# Patient Record
Sex: FEMALE | URBAN - METROPOLITAN AREA
[De-identification: names, ages, dates, MRNs, and addresses within clinical notes are randomized per-mention and may not be internally consistent; named-entity substitution may affect disease eponyms.]

---

## 2024-02-01 ENCOUNTER — EMERGENCY (EMERGENCY)
Facility: HOSPITAL | Age: 34
LOS: 1 days | Discharge: HOME | End: 2024-02-01
Attending: EMERGENCY MEDICINE | Admitting: EMERGENCY MEDICINE
Payer: MEDICAID

## 2024-02-01 VITALS
HEART RATE: 78 BPM | SYSTOLIC BLOOD PRESSURE: 109 MMHG | TEMPERATURE: 98 F | OXYGEN SATURATION: 100 % | RESPIRATION RATE: 18 BRPM | DIASTOLIC BLOOD PRESSURE: 80 MMHG

## 2024-02-01 PROCEDURE — 99285 EMERGENCY DEPT VISIT HI MDM: CPT

## 2024-02-01 NOTE — ED ADULT TRIAGE NOTE - CHIEF COMPLAINT QUOTE
patient c/o left foot pain post slip and fall in [place]. Patient unsure what xrays states but believes fracture. Patient able to move left foot digits.

## 2024-02-02 VITALS
RESPIRATION RATE: 17 BRPM | SYSTOLIC BLOOD PRESSURE: 106 MMHG | TEMPERATURE: 98 F | DIASTOLIC BLOOD PRESSURE: 69 MMHG | HEART RATE: 74 BPM | OXYGEN SATURATION: 100 %

## 2024-02-02 PROBLEM — Z00.00 ENCOUNTER FOR PREVENTIVE HEALTH EXAMINATION: Status: ACTIVE | Noted: 2024-02-02

## 2024-02-02 PROCEDURE — 73610 X-RAY EXAM OF ANKLE: CPT | Mod: 26,LT

## 2024-02-02 PROCEDURE — 73600 X-RAY EXAM OF ANKLE: CPT | Mod: 26,59,LT

## 2024-02-02 PROCEDURE — 73590 X-RAY EXAM OF LOWER LEG: CPT | Mod: 26,LT

## 2024-02-02 PROCEDURE — 73630 X-RAY EXAM OF FOOT: CPT | Mod: 26,LT

## 2024-02-02 PROCEDURE — 99283 EMERGENCY DEPT VISIT LOW MDM: CPT | Mod: 25,GC

## 2024-02-02 PROCEDURE — 27788 TREATMENT OF ANKLE FRACTURE: CPT | Mod: LT

## 2024-02-02 RX ORDER — OXYCODONE HYDROCHLORIDE 5 MG/1
1 TABLET ORAL
Qty: 9 | Refills: 0
Start: 2024-02-02 | End: 2024-02-04

## 2024-02-02 RX ORDER — OXYCODONE HYDROCHLORIDE 5 MG/1
5 TABLET ORAL ONCE
Refills: 0 | Status: DISCONTINUED | OUTPATIENT
Start: 2024-02-02 | End: 2024-02-02

## 2024-02-02 RX ORDER — IBUPROFEN 200 MG
1 TABLET ORAL
Qty: 21 | Refills: 0
Start: 2024-02-02 | End: 2024-02-08

## 2024-02-02 RX ORDER — OXYCODONE AND ACETAMINOPHEN 5; 325 MG/1; MG/1
1 TABLET ORAL
Qty: 9 | Refills: 0
Start: 2024-02-02 | End: 2024-02-04

## 2024-02-02 RX ORDER — ACETAMINOPHEN 500 MG
650 TABLET ORAL ONCE
Refills: 0 | Status: COMPLETED | OUTPATIENT
Start: 2024-02-02 | End: 2024-02-02

## 2024-02-02 RX ADMIN — OXYCODONE HYDROCHLORIDE 5 MILLIGRAM(S): 5 TABLET ORAL at 03:39

## 2024-02-02 RX ADMIN — Medication 650 MILLIGRAM(S): at 03:39

## 2024-02-02 NOTE — ED PROVIDER NOTE - NSFOLLOWUPINSTRUCTIONS_ED_ALL_ED_FT
Follow-up with Dr. Mitchell in one week. Call office for appointment.    -Take Ibuprofen as prescribed. Take this medication with food.  -Take Percocet as prescribed. Do not drink alcohol or drive while taking this medication as it can make you drowsy.  -Keep leg elevated at rest  -No weight on left leg, use crutches    Seek medical attention if your symptoms worsen or if you have any concerns.

## 2024-02-02 NOTE — CONSULT NOTE ADULT - ASSESSMENT
**incomplete note**    ASSESSMENT & PLAN  33yFemale w/ L ankle fx s/p closed reduction and immobilization.  -NWB LLE in a short-leg trilam splint  -splint precautions  -[anything unique to this pt]  -pain control  -ice/cold compress, elevation  -no acute ortho surgery at this time  -f/u outpt with  _ within 1 week, call office for appt        ASSESSMENT & PLAN  33yFemale w/ L ankle fx s/p closed reduction and immobilization.  -NWB LLE in a short-leg trilam splint  - Splint precautions:  Keep Splint dry  Elevate extremity, can try and ice through the Splint   Do not stick anything into the Splint    -pain control  -ice/cold compress, elevation  -no acute ortho surgery at this time  -f/u outpt with Dr. Portillo  within 1 week, call office for appt

## 2024-02-02 NOTE — ED PROVIDER NOTE - PATIENT PORTAL LINK FT
You can access the FollowMyHealth Patient Portal offered by [facility] by registering at the following website: [email or web address]. By joining [facility]’s FollowMyHealth portal, you will also be able to view your health information using other applications (apps) compatible with our system.

## 2024-02-02 NOTE — ED PROVIDER NOTE - PHYSICAL EXAMINATION
CONSTITUTIONAL: Well appearing, well nourished, awake, alert, oriented to person, place, time/situation and in no apparent distress  ENMT: Airway patent, no gum fluctuance or erythema  EYES: Clear bilaterally  MUSCULOSKELETAL: LLE in posterior splint secured with ace wrap, toes warm to touch, cap refill <3 sec, sensation in tact to light touch  NEUROLOGIC: CN II-XII grossly intact, moves all extremities without lateralization  SKIN: Exposed skin normal color for race, warm, dry and intact

## 2024-02-02 NOTE — ED PROVIDER NOTE - OBJECTIVE STATEMENT
33F no PMH presents with left leg injury and dental pain. Pt twisted her ankle walking in rain in the [place] 2 days ago. Seen at medical facility where she was diagnosed with a "broken ankle" and had it "aligned under anesthesia". Pt was advised to present to ER upon return to [place]. Left leg in splint and patient on crutches. Not taking pain medication. Pt also notes 3 months of upper right dental pain after having fillings done. No fever. Able to eat. Has not yet had dental follow-up.

## 2024-02-02 NOTE — ED ADULT NURSE NOTE - NSFALLRISKINTERV_ED_ALL_ED

## 2024-02-02 NOTE — ED ADULT NURSE NOTE - OBJECTIVE STATEMENT
Pt received in intake 10c. Pt alert and oriented x 3, ambulatory at baseline. Pt denies pertinent medical history. Pt c/o left foot pain rated 8/10 status post slip and fall in DR. Pt reports being seen and was told to have a fracture. Respirations even and unlabored, NAD. Left foot wrapped in ace bandage. Respirations even and unlabored, NAD. Pt denies chest pain, shortness of breath, N/v/D, headache, dizziness, weakness, fever, chills,  symptoms. Pt medicated per MD orders.
General

## 2024-02-02 NOTE — CONSULT NOTE ADULT - SUBJECTIVE AND OBJECTIVE BOX
HPI  33yFemale w/ _ c/o L ankle pain s/p mechanical fall. Unable to bear weight in the LLE since the fall. Denies headstrike or LOC. Denies numbness/tingling in the LLE. Denies any other trauma/injuries at this time. At baseline, community ambulator w/o assistive devices.    ROS  Negative unless otherwise specified in HPI.    PAST MEDICAL & SURGICAL Hx  PAST MEDICAL & SURGICAL HISTORY:      MEDICATIONS  Home Medications:      ALLERGIES  Allergy Status Unknown      FAMILY Hx  FAMILY HISTORY:      SOCIAL Hx  Social History:      VITALS  Vital Signs Last 24 Hrs  T(C): 36.7 (01 Feb 2024 22:57), Max: 36.7 (01 Feb 2024 22:57)  T(F): 98.1 (01 Feb 2024 22:57), Max: 98.1 (01 Feb 2024 22:57)  HR: 78 (01 Feb 2024 22:57) (78 - 78)  BP: 109/80 (01 Feb 2024 22:57) (109/80 - 109/80)  BP(mean): --  RR: 18 (01 Feb 2024 22:57) (18 - 18)  SpO2: 100% (01 Feb 2024 22:57) (100% - 100%)    Parameters below as of 01 Feb 2024 22:57  Patient On (Oxygen Delivery Method): room air        PHYSICAL EXAM  Gen: Lying in bed, NAD  Resp: No increased WOB  LLE:  Skin intact with fracture blisters on medial ankle, +edema and +ecchymosis over R ankle  +TTP over medial L ankle, no TTP along remainder of extremity; compartments soft  Limited ROM at ankle 2/2 pain  Motor: TA/EHL/GS/FHL intact  Sensory: DP/SP/Tib/Padmini/Saph SILT  +DP pulse, WWP        LABS              IMAGING  XRs: R ankle fx (personal read)    PROCEDURE  Using aseptic technique, a hematoma block was administered to the fracture site using 10cc of 1% lidocaine without epinephrine. Closed reduction was subsequently performed and a well-padded, well-molded plaster splint applied. The patient tolerated the procedure well without evidence of complications. The patient was neurovascularly intact following reduction. Post-reduction XRs demonstrated acceptable alignment. The patient was informed about splint precautions (keep dry, do not stick anything inside, monitor for signs/symptoms of increased compartmental pressure: uncontrolled pain, worsening numbness/tingling, severe pain with movement of the fingers/toes) and verbalized understanding.   HPI  33yFemale w/ c/o L ankle pain s/p mechanical fall 2 days ago. Unable to bear weight in the LLE since the fall. Denies headstrike or LOC. Patient had fall in [place]. Pt reports ankle was dislocated and put back in place in [place]. Placed in a splint and pt now visits [facility] ED for further management. Denies numbness/tingling in the LLE. Denies any other trauma/injuries at this time. Walks independently at baseline without assistive devices    ROS  Negative unless otherwise specified in HPI.    PAST MEDICAL & SURGICAL Hx  PAST MEDICAL & SURGICAL HISTORY:      MEDICATIONS  Home Medications:      ALLERGIES  Allergy Status Unknown      FAMILY Hx  FAMILY HISTORY:      SOCIAL Hx  Social History:      VITALS  Vital Signs Last 24 Hrs  T(C): 36.7 (01 Feb 2024 22:57), Max: 36.7 (01 Feb 2024 22:57)  T(F): 98.1 (01 Feb 2024 22:57), Max: 98.1 (01 Feb 2024 22:57)  HR: 78 (01 Feb 2024 22:57) (78 - 78)  BP: 109/80 (01 Feb 2024 22:57) (109/80 - 109/80)  BP(mean): --  RR: 18 (01 Feb 2024 22:57) (18 - 18)  SpO2: 100% (01 Feb 2024 22:57) (100% - 100%)    Parameters below as of 01 Feb 2024 22:57  Patient On (Oxygen Delivery Method): room air        PHYSICAL EXAM  Gen: Lying in bed, NAD  Resp: No increased WOB  LLE:  Skin intact with fracture blisters on medial ankle, +edema and +ecchymosis over R ankle  +TTP over medial L ankle, no TTP along remainder of extremity; compartments soft  Limited ROM at ankle 2/2 pain  Motor: TA/EHL/GS/FHL intact  Sensory: DP/SP/Tib/Padmini/Saph SILT  +DP pulse, WWP        LABS              IMAGING  XRs: R ankle fx (personal read)    PROCEDURE  Using aseptic technique, a hematoma block was administered to the fracture site using 10cc of 1% lidocaine without epinephrine. Closed reduction was subsequently performed and a well-padded, well-molded plaster splint applied. The patient tolerated the procedure well without evidence of complications. The patient was neurovascularly intact following reduction. Post-reduction XRs demonstrated acceptable alignment. The patient was informed about splint precautions (keep dry, do not stick anything inside, monitor for signs/symptoms of increased compartmental pressure: uncontrolled pain, worsening numbness/tingling, severe pain with movement of the fingers/toes) and verbalized understanding.

## 2024-02-02 NOTE — ED PROVIDER NOTE - CARE PROVIDER_API CALL
Esteban Mitchell  Orthopaedic Surgery  [address]  [place] [address]  Phone: [phone]  Fax: [phone]  Follow Up Time:

## 2024-02-05 ENCOUNTER — APPOINTMENT (OUTPATIENT)
Dept: ORTHOPEDIC SURGERY | Facility: CLINIC | Age: 34
End: 2024-02-05

## 2024-02-06 ENCOUNTER — APPOINTMENT (OUTPATIENT)
Dept: ORTHOPEDIC SURGERY | Facility: CLINIC | Age: 34
End: 2024-02-06
Payer: MEDICAID

## 2024-02-06 ENCOUNTER — NON-APPOINTMENT (OUTPATIENT)
Age: 34
End: 2024-02-06

## 2024-02-06 VITALS — WEIGHT: 145 LBS | BODY MASS INDEX: 25.69 KG/M2 | HEIGHT: 63 IN

## 2024-02-06 DIAGNOSIS — S82.892A OTHER FRACTURE OF LEFT LOWER LEG, INITIAL ENCOUNTER FOR CLOSED FRACTURE: ICD-10-CM

## 2024-02-06 PROCEDURE — 99204 OFFICE O/P NEW MOD 45 MIN: CPT

## 2024-02-06 PROCEDURE — 73610 X-RAY EXAM OF ANKLE: CPT | Mod: LT

## 2024-02-06 NOTE — HISTORY OF PRESENT ILLNESS
[de-identified] : 33-year-old female presents with left ankle pain.  She was traveling in [place] on 1/30 and twisted the left ankle when it was raining.  She was unable to ambulate and was brought to local emergency room.  She was found to have a bimalleolar ankle fracture and this was reduced and splinted.  She returned to [place] and was seen at the [facility] emergency room, x-rays were taken and the splint was re-applied.  She has been nonweightbearing and using crutches.  She is taking Tylenol and NSAIDs.  She has been elevating the ankle.  No history of ankle issues in the past. Reports no past medical history.

## 2024-02-06 NOTE — DISCUSSION/SUMMARY
[de-identified] : 33-year-old female with left ankle trimalleolar fracture, consistent with SER IV type fracture.  This is an unstable ankle injury. I discussed the nature of this condition and management with the patient. She is indicated for left ankle open reduction internal fixation and possible syndesmotic fixation. An extensive discussion was conducted of the natural history of the disease and the variety of surgical and non-surgical treatment options available to the patient. A risk/benefit analysis was discussed with the patient reviewing the advantages and disadvantages of surgical intervention at this time. A full explanation was given of the nature and the purpose of the procedure and anesthesia, its benefits, possible alternative methods of diagnosis or treatment, the risks involved, the possibility of complications, the foreseeable consequences of the procedure and the possible results of the non-treatment. The patient agrees with the plan of care, as well as the use of implants for left ankle ORIF. Specifically, the risks were identified to include, but are not limited to the following: Infection, phlebitis, DVT, pulmonary embolism, paralysis, dislocation, pain, stiffness, instability, limp, weakness, breakage, uncontrolled bleeding, nerve injury, blood vessel injury, pressure sores, anesthetic risks, delayed healing of wound and bone, wear and loosening, and need for additional surgery. Further discussion was undertaken with the patient about the details of surgical preparation, treatment, and postoperative rehabilitation including medical clearance, autotransfusion, the hospital course, and the postoperative rehabilitation involved. Surgery will be scheduled at a mutually convenient time.

## 2024-02-06 NOTE — PHYSICAL EXAM
[Crutches] : ambulates with crutches [de-identified] : General: No acute distress Mental: Alert and oriented x3 Eyes: Conjunctivitis not seen Chest: Symmetric chest rise, no audible wheezing Skin: Bilateral lower extremities absent from rashes and ulcers Abdomen: No distention  Left ankle: Skin is intact, mild ecchymosis, mild swelling Brisk capillary refill at the toes Intact EHL/FHL Sensation intact to light touch at the SP/DP/T distributions [de-identified] : X-rays of the left ankle today shows trimalleolar ankle fracture with oblique distal fibula and transverse medial malleolus fracture, appropriate position of the talus, medial mortise reduced, ankle in neutral dorsiflexion.

## 2024-02-08 ENCOUNTER — OUTPATIENT (OUTPATIENT)
Dept: OUTPATIENT SERVICES | Facility: HOSPITAL | Age: 34
LOS: 1 days | Discharge: HOME | End: 2024-02-08
Payer: MEDICAID

## 2024-02-08 ENCOUNTER — RESULT REVIEW (OUTPATIENT)
Age: 34
End: 2024-02-08

## 2024-02-08 ENCOUNTER — APPOINTMENT (OUTPATIENT)
Dept: ORTHOPEDIC SURGERY | Facility: HOSPITAL | Age: 34
End: 2024-02-08

## 2024-02-08 ENCOUNTER — TRANSCRIPTION ENCOUNTER (OUTPATIENT)
Age: 34
End: 2024-02-08

## 2024-02-08 VITALS
WEIGHT: 145.06 LBS | TEMPERATURE: 98 F | OXYGEN SATURATION: 100 % | DIASTOLIC BLOOD PRESSURE: 73 MMHG | HEART RATE: 69 BPM | SYSTOLIC BLOOD PRESSURE: 111 MMHG | HEIGHT: 63 IN | RESPIRATION RATE: 17 BRPM

## 2024-02-08 VITALS
RESPIRATION RATE: 16 BRPM | SYSTOLIC BLOOD PRESSURE: 126 MMHG | DIASTOLIC BLOOD PRESSURE: 72 MMHG | TEMPERATURE: 97 F | HEART RATE: 75 BPM | OXYGEN SATURATION: 99 %

## 2024-02-08 LAB — HCG UR QL: NEGATIVE — SIGNIFICANT CHANGE UP

## 2024-02-08 PROCEDURE — 73600 X-RAY EXAM OF ANKLE: CPT | Mod: 26,LT

## 2024-02-08 PROCEDURE — 27814 TREATMENT OF ANKLE FRACTURE: CPT | Mod: LT

## 2024-02-08 DEVICE — SCREW LOKG 3.5X14MM: Type: IMPLANTABLE DEVICE | Site: LEFT | Status: FUNCTIONAL

## 2024-02-08 DEVICE — GWIRE 1.4X150MM STRL: Type: IMPLANTABLE DEVICE | Site: LEFT | Status: FUNCTIONAL

## 2024-02-08 DEVICE — SCREW 3.5X26MM: Type: IMPLANTABLE DEVICE | Site: LEFT | Status: FUNCTIONAL

## 2024-02-08 DEVICE — SCREW LOCKING 3.5X10: Type: IMPLANTABLE DEVICE | Site: LEFT | Status: FUNCTIONAL

## 2024-02-08 DEVICE — SCREW 3.5X22: Type: IMPLANTABLE DEVICE | Site: LEFT | Status: FUNCTIONAL

## 2024-02-08 DEVICE — SCREW NON-LOCKG 3.5X28: Type: IMPLANTABLE DEVICE | Site: LEFT | Status: FUNCTIONAL

## 2024-02-08 DEVICE — PLATE FIB DIST LAT 5H: Type: IMPLANTABLE DEVICE | Site: LEFT | Status: FUNCTIONAL

## 2024-02-08 DEVICE — SCREW FT 3.5X12MM: Type: IMPLANTABLE DEVICE | Site: LEFT | Status: FUNCTIONAL

## 2024-02-08 DEVICE — SCREW 3.5X14MM: Type: IMPLANTABLE DEVICE | Site: LEFT | Status: FUNCTIONAL

## 2024-02-08 DEVICE — SCREW CANN 4X40MM: Type: IMPLANTABLE DEVICE | Site: LEFT | Status: FUNCTIONAL

## 2024-02-08 RX ORDER — OXYCODONE HYDROCHLORIDE 5 MG/1
1 TABLET ORAL
Qty: 28 | Refills: 0
Start: 2024-02-08 | End: 2024-02-14

## 2024-02-08 RX ORDER — ONDANSETRON 8 MG/1
4 TABLET, FILM COATED ORAL ONCE
Refills: 0 | Status: DISCONTINUED | OUTPATIENT
Start: 2024-02-08 | End: 2024-02-08

## 2024-02-08 RX ORDER — SODIUM CHLORIDE 9 MG/ML
1000 INJECTION, SOLUTION INTRAVENOUS
Refills: 0 | Status: DISCONTINUED | OUTPATIENT
Start: 2024-02-08 | End: 2024-02-08

## 2024-02-08 RX ORDER — OXYCODONE HYDROCHLORIDE 5 MG/1
5 TABLET ORAL ONCE
Refills: 0 | Status: DISCONTINUED | OUTPATIENT
Start: 2024-02-08 | End: 2024-02-08

## 2024-02-08 RX ORDER — DOCUSATE SODIUM 100 MG
1 CAPSULE ORAL
Qty: 20 | Refills: 0
Start: 2024-02-08

## 2024-02-08 RX ORDER — OXYCODONE HYDROCHLORIDE 5 MG/1
1 TABLET ORAL
Qty: 20 | Refills: 0
Start: 2024-02-08 | End: 2024-02-12

## 2024-02-08 RX ORDER — ASPIRIN/CALCIUM CARB/MAGNESIUM 324 MG
1 TABLET ORAL
Qty: 60 | Refills: 0
Start: 2024-02-08 | End: 2024-03-08

## 2024-02-08 RX ORDER — ONDANSETRON 8 MG/1
1 TABLET, FILM COATED ORAL
Qty: 20 | Refills: 0
Start: 2024-02-08

## 2024-02-08 RX ORDER — HYDROMORPHONE HYDROCHLORIDE 2 MG/ML
0.5 INJECTION INTRAMUSCULAR; INTRAVENOUS; SUBCUTANEOUS
Refills: 0 | Status: DISCONTINUED | OUTPATIENT
Start: 2024-02-08 | End: 2024-02-08

## 2024-02-08 RX ADMIN — HYDROMORPHONE HYDROCHLORIDE 0.5 MILLIGRAM(S): 2 INJECTION INTRAMUSCULAR; INTRAVENOUS; SUBCUTANEOUS at 10:47

## 2024-02-08 RX ADMIN — HYDROMORPHONE HYDROCHLORIDE 0.5 MILLIGRAM(S): 2 INJECTION INTRAMUSCULAR; INTRAVENOUS; SUBCUTANEOUS at 10:32

## 2024-02-08 RX ADMIN — SODIUM CHLORIDE 60 MILLILITER(S): 9 INJECTION, SOLUTION INTRAVENOUS at 10:32

## 2024-02-08 NOTE — ASU PATIENT PROFILE, ADULT - FALL HARM RISK - FALLEN IN PAST
abnml labs - wbc was 19 now 14- px with very poor po intake- concern re possible eating disorder- ketones noted
Accidental fall

## 2024-02-08 NOTE — ASU PATIENT PROFILE, ADULT - FALL HARM RISK - HARM RISK INTERVENTIONS

## 2024-02-08 NOTE — H&P PST ADULT - ATTENDING COMMENTS
Left ankle bimalleolar fracture, provisionally splinted at outside hospital.  Presents for left ankle open reduction internal fixation.   vitals/labs reviewed    LLE:  intact skin  intact EHL/FHL  SILT at toes    Risks/benefits/alternatives discussed in detail.  Informed consent signed.

## 2024-02-08 NOTE — H&P PST ADULT - HISTORY OF PRESENT ILLNESS
Patient is a 33yFemale with no significant medical history presenting with L ankle fracture. Pt slipped and fell while in the [place] 2 weeks ago. Pt initially presented to [facility] immediately after arriving back to [place], where she was reduced and splinted. States inability to walk immediately following the injury. Denies any numbness or tingling. Denies having any other pain elsewhere. Pt was seen outpatient for follow up and was scheduled for L ankle ORIF on 2/8/24. Denies any previous orthopedic history. No other orthopedic concerns at this time.      Allergy Status Unknown      PHYSICAL EXAM:  T(C): 36.8 (02-08-24 @ 06:54), Max: 36.8 (02-08-24 @ 06:54)  HR: 69 (02-08-24 @ 06:54) (69 - 69)  BP: 111/73 (02-08-24 @ 06:54) (111/73 - 111/73)  RR: 17 (02-08-24 @ 06:54) (17 - 17)  SpO2: 100% (02-08-24 @ 06:54) (100% - 100%)    Gen: NAD, Resting comfortably    LLE:  Splint intact  Motor: can grossly wiggle toes  Sensation intact to all toes  <2s capillary refill    Secondary Assessment:  NC/AT, NTTP of clavicles, NTTP of C-,T-,L-Spine, NTTP of Pelvis  LUE: NTTP of Shoulder, Elbow, Wrist, Hand; NT with AROM/PROM of Shoulder, Elbow, Wrist, Hand; AIN/PIN/Med/Uln/Msc/Rad/Ax intact  RUE: NTTP of Shoulder, Elbow, Wrist, Hand; NT with AROM/PROM of Shoulder, Elbow, Wrist, Hand; AIN/PIN/Med/Uln/Msc/Rad/Ax intact  LLE: Able to SLR, NT with Log Roll, NT with Heel Strike, NTTP of Hip, Knee, Ankle, foot; NT with AROM/PROM of Hip, Knee, Ankle, footQ/H/Gsc/TA/EHL/FHL intact      A/P: 33yFemale w/ L ankle trimalleolar fracture s/p splint    ORIF of L ankle fracture on 2/8/24  Analgesia  NWB LLE in splint  NPO after midnight prior to surgery  Hold chemical DVT ppx until surgery  Ice and elevate as tolerated

## 2024-02-08 NOTE — ASU DISCHARGE PLAN (ADULT/PEDIATRIC) - NS MD DC FALL RISK RISK
For information on Fall & Injury Prevention, visit: [email or web address] OR  [email or web address] OR  https://www.cdc.gov/steadi/patient.html

## 2024-02-08 NOTE — ASU DISCHARGE PLAN (ADULT/PEDIATRIC) - CARE PROVIDER_API CALL
Juan Geronimo  Orthopaedic Surgery  [address]  [place] [address]  Phone: [phone]  Fax: [phone]  Follow Up Time:

## 2024-02-08 NOTE — ASU DISCHARGE PLAN (ADULT/PEDIATRIC) - ASU DC SPECIAL INSTRUCTIONSFT
NWB to Left LE Discharge Instructions for Ankle ORIF    1. ACTIVITY: No weight bearing of L leg. Keep splint on.  2. CALL FOR: fever over 101, wound redness, drainage or open area, calf pain/calf swelling.  3. BANDAGE: Do not remove splint, keep it clean, dry and intact.  4. Sutures: See Dr. Geronimo in the office in 2 weeks for suture removal.  5. SHOWER: Do not get the splint wet. Get a dedicated cast bag to cover splint while in shower.  6. DVT PE Prophylaxis: Aspirin 81mg twice daily.  7.  FOLLOW UP: Dr. Geronimo in 2 weeks. Call to schedule.  8. MEDICATION: eRX sent to your pharmacy for  if you go home.   9.**Call office if medications not covered under your insurance, especially BLOOD CLOT PREVENTION/anticoagulant medication.

## 2024-02-12 DIAGNOSIS — S82.842A DISPLACED BIMALLEOLAR FRACTURE OF LEFT LOWER LEG, INITIAL ENCOUNTER FOR CLOSED FRACTURE: ICD-10-CM

## 2024-02-12 DIAGNOSIS — E78.5 HYPERLIPIDEMIA, UNSPECIFIED: ICD-10-CM

## 2024-02-12 DIAGNOSIS — Z79.82 LONG TERM (CURRENT) USE OF ASPIRIN: ICD-10-CM
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
## 2024-02-12 DIAGNOSIS — I10 ESSENTIAL (PRIMARY) HYPERTENSION: ICD-10-CM

## 2024-02-22 ENCOUNTER — APPOINTMENT (OUTPATIENT)
Dept: ORTHOPEDIC SURGERY | Facility: CLINIC | Age: 34
End: 2024-02-22
Payer: MEDICAID

## 2024-02-22 PROCEDURE — 99024 POSTOP FOLLOW-UP VISIT: CPT

## 2024-02-22 PROCEDURE — 73610 X-RAY EXAM OF ANKLE: CPT | Mod: LT

## 2024-02-22 NOTE — HISTORY OF PRESENT ILLNESS
[Xray (Date:___)] : [unfilled] Xray -  [Doing Well] : is doing well [Excellent Pain Control] : has excellent pain control [de-identified] : Date of Surgery: 2/8/24 Procedure: Left ankle ORIF [de-identified] : Doing well following surgery, pain controlled.  Reports intermittent throbbing.  She is icing, elevating, and resting.  Compliant with nonweightbearing.  She is on aspirin for DVT prophylaxis. [de-identified] : Splint removed, incision clean and dry.  Sutures removed.  Mild swelling at the foot.  Intact EHL/FHL/GS/TA.  Sensation intact light touch throughout the foot and toes.  Brisk capillary refill. [de-identified] : Left ankle x-rays today show bimalleolar fixation, hardware intact, no hardware complications, medial mortise maintained. [de-identified] : 2 weeks s/p left ankle ORIF. [de-identified] : Nonweightbearing left lower extremity.  She was prescribed a CAM boot today.  Begin ankle range of motion exercises.  Continue aspirin for additional 2 weeks.  Wound care instructions reviewed.  Follow-up 4 weeks with repeat x-rays

## 2024-03-07 ENCOUNTER — TRANSCRIPTION ENCOUNTER (OUTPATIENT)
Age: 34
End: 2024-03-07

## 2024-03-19 ENCOUNTER — APPOINTMENT (OUTPATIENT)
Dept: ORTHOPEDIC SURGERY | Facility: CLINIC | Age: 34
End: 2024-03-19
Payer: MEDICAID

## 2024-03-19 PROCEDURE — 99024 POSTOP FOLLOW-UP VISIT: CPT

## 2024-03-19 PROCEDURE — 73610 X-RAY EXAM OF ANKLE: CPT | Mod: LT

## 2024-03-19 NOTE — HISTORY OF PRESENT ILLNESS
[Xray (Date:___)] : [unfilled] Xray -  [Doing Well] : is doing well [Excellent Pain Control] : has excellent pain control [___ Weeks Post Op] : [unfilled] weeks post op [de-identified] : Date of Surgery: 2/8/24 Procedure: Left ankle ORIF [de-identified] : Lateral incision healed. Medial incision with small opening and loose suture removed. No drainage or erythema.  Intact EHL/FHL/GS/TA.  Sensation intact light touch throughout the foot and toes.  Brisk capillary refill. [de-identified] : Pain improving, some medial soreness.  Medial wound has not completely healed.  She is doing ankle stretching on her own. [de-identified] : 6 weeks s/p left ankle ORIF. [de-identified] : Left ankle x-rays today show bimalleolar fixation with anatomic alignment of the fibula, residual fracture line at the medial malleolus. [de-identified] : 25% PWB LLE. Start PT. Wound care reviewed.  Advanced to WBAT in 4 weeks. Followup 6 weeks with repeat xray.

## 2024-03-22 ENCOUNTER — APPOINTMENT (OUTPATIENT)
Dept: ORTHOPEDIC SURGERY | Facility: CLINIC | Age: 34
End: 2024-03-22
Payer: MEDICAID

## 2024-03-22 PROCEDURE — 99024 POSTOP FOLLOW-UP VISIT: CPT

## 2024-03-22 NOTE — HISTORY OF PRESENT ILLNESS
[___ Weeks Post Op] : [unfilled] weeks post op [Xray (Date:___)] : [unfilled] Xray -  [Doing Well] : is doing well [Excellent Pain Control] : has excellent pain control [de-identified] : Date of Surgery: 2/8/24 Procedure: Left ankle ORIF [de-identified] : She presents for an urgent appointment due to slight drainage from the medial incision.  She was doing physical therapy and her therapist noticed a yellow discharge from the medial wound.  She denies erythema, swelling, fevers.  She has been using Neosporin over the wound.  She is 25% partial weightbearing with crutches. [de-identified] : Left ankle medial incision with superficial scab at the central aspect, scant serous drainage, no swelling, no erythema.  No expressible drainage.  Mild tenderness around the incision site [de-identified] : 6 weeks s/p left ankle ORIF.  Scant drainage of serous fluid from the medial incision and overlying scab.  No concern for infection. [de-identified] : There is delayed wound healing of the medial incision with superficial scabbing and scant drainage of serous fluid.  This is not concerning for infection.  We discussed wound care including hydrogen peroxide, soap and water wash, dry bandage daily.  She will avoid any full submersion of the ankle.  Continue 25% partial weightbearing and physical therapy.  Follow-up in 4 weeks for wound check and repeat x-ray.

## 2024-04-17 ENCOUNTER — APPOINTMENT (OUTPATIENT)
Dept: ORTHOPEDIC SURGERY | Facility: CLINIC | Age: 34
End: 2024-04-17
Payer: MEDICAID

## 2024-04-17 DIAGNOSIS — Z87.81 OTHER SPECIFIED POSTPROCEDURAL STATES: ICD-10-CM

## 2024-04-17 DIAGNOSIS — Z98.890 OTHER SPECIFIED POSTPROCEDURAL STATES: ICD-10-CM

## 2024-04-17 PROCEDURE — 73610 X-RAY EXAM OF ANKLE: CPT | Mod: RT

## 2024-04-17 PROCEDURE — 99024 POSTOP FOLLOW-UP VISIT: CPT

## 2024-04-27 NOTE — HISTORY OF PRESENT ILLNESS
[___ Weeks Post Op] : [unfilled] weeks post op [Neuro Intact] : an unremarkable neurological exam [Vascular Intact] : ~T peripheral vascular exam normal [Xray (Date:___)] : [unfilled] Xray -  [Doing Well] : is doing well [Excellent Pain Control] : has excellent pain control [de-identified] : Date of Surgery: 2/8/24 Procedure: Left ankle ORIF [de-identified] : Reports minimal ankle pain. Medial wound improving and no recent drainage. She denies swelling. Compliant with partial weightbearing. [de-identified] : Left ankle medial incision with improved healing, incision c/d/i with near complete healing. Lateral incision healed. Ankle range of motion 0-30. [de-identified] : Right ankle xray 3 views shows excellent fracture alignment, bony bridging, intact medial mortise, intact hardware. [de-identified] : 10 weeks s/p left ankle ORIF.  Incision improved. Clinically and radiographically improving. The fracture appears healed. [de-identified] : WBAT, continue PT. Wound care discussed. She may return to work. Followup as needed.

## 2024-04-30 ENCOUNTER — APPOINTMENT (OUTPATIENT)
Dept: ORTHOPEDIC SURGERY | Facility: CLINIC | Age: 34
End: 2024-04-30
